# Patient Record
Sex: MALE | Race: WHITE | NOT HISPANIC OR LATINO | Employment: UNEMPLOYED | URBAN - METROPOLITAN AREA
[De-identification: names, ages, dates, MRNs, and addresses within clinical notes are randomized per-mention and may not be internally consistent; named-entity substitution may affect disease eponyms.]

---
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
## 2024-03-10 ENCOUNTER — OFFICE VISIT (OUTPATIENT)
Dept: URGENT CARE | Facility: CLINIC | Age: 24
End: 2024-03-10
Payer: COMMERCIAL

## 2024-03-10 ENCOUNTER — APPOINTMENT (OUTPATIENT)
Dept: URGENT CARE | Facility: CLINIC | Age: 24
End: 2024-03-10

## 2024-03-10 VITALS
BODY MASS INDEX: 23.78 KG/M2 | HEART RATE: 69 BPM | TEMPERATURE: 97.9 F | WEIGHT: 148 LBS | OXYGEN SATURATION: 97 % | DIASTOLIC BLOOD PRESSURE: 60 MMHG | HEIGHT: 66 IN | RESPIRATION RATE: 16 BRPM | SYSTOLIC BLOOD PRESSURE: 110 MMHG

## 2024-03-10 DIAGNOSIS — K12.1 STOMATITIS: Primary | ICD-10-CM

## 2024-03-10 DIAGNOSIS — J02.9 SORE THROAT: ICD-10-CM

## 2024-03-10 LAB — S PYO DNA SPEC NAA+PROBE: NOT DETECTED

## 2024-03-10 PROCEDURE — 3078F DIAST BP <80 MM HG: CPT | Performed by: PHYSICIAN ASSISTANT

## 2024-03-10 PROCEDURE — 87651 STREP A DNA AMP PROBE: CPT | Performed by: PHYSICIAN ASSISTANT

## 2024-03-10 PROCEDURE — 99203 OFFICE O/P NEW LOW 30 MIN: CPT | Performed by: PHYSICIAN ASSISTANT

## 2024-03-10 PROCEDURE — 3074F SYST BP LT 130 MM HG: CPT | Performed by: PHYSICIAN ASSISTANT

## 2024-03-10 ASSESSMENT — ENCOUNTER SYMPTOMS
SORE THROAT: 1
GASTROINTESTINAL NEGATIVE: 1
FEVER: 0
TROUBLE SWALLOWING: 0
CHILLS: 0
COUGH: 0
SWOLLEN GLANDS: 0
SHORTNESS OF BREATH: 0
WHEEZING: 0

## 2024-03-10 NOTE — LETTER
March 10, 2024         Patient: Evin Valdovinos   YOB: 2000   Date of Visit: 3/10/2024           To Whom it May Concern:    Evin Valdovinos was seen in my clinic on 3/10/2024. He may return to work on 3/12/2024 or sooner if condition improves sooner.       If you have any questions or concerns, please don't hesitate to call.        Sincerely,           Tasha Bravo P.A.-C.  Electronically Signed

## 2024-03-10 NOTE — PROGRESS NOTES
"Subjective     Evin Valdovinos is a 23 y.o. male who presents with Sore Throat (X 2 days )    PMH:  has no past medical history on file.  MEDS: No current outpatient medications on file.  ALLERGIES: No Known Allergies  SURGHX:   Past Surgical History:   Procedure Laterality Date    OTHER ORTHOPEDIC SURGERY      cleft lip     SOCHX:  reports that he has never smoked. He does not have any smokeless tobacco history on file. He reports that he does not drink alcohol and does not use drugs.  FH: Reviewed with patient, not pertinent to this visit.           Patient presents with:  Sore Throat: X 2 days with small spots on tonsils.  Patient is concerned he might have strep throat.  Patient denies fever, chills, headache, nausea vomiting or diarrhea.  Patient reports that everybody in his household has been sick over the course of the last month, he is the last person to get sick.   Patient has not tried any over-the-counter medications for his symptoms.  No other complaints.         Pharyngitis   This is a new problem. The current episode started yesterday. The problem has been gradually worsening. Neither side of throat is experiencing more pain than the other. There has been no fever. The pain is at a severity of 5/10. Pertinent negatives include no congestion, coughing, plugged ear sensation, shortness of breath, swollen glands or trouble swallowing. He has tried cool liquids and gargles for the symptoms. The treatment provided no relief.       Review of Systems   Constitutional:  Negative for chills and fever.   HENT:  Positive for sore throat. Negative for congestion and trouble swallowing.    Respiratory:  Negative for cough, shortness of breath and wheezing.    Gastrointestinal: Negative.    All other systems reviewed and are negative.             Objective     /60   Pulse 69   Temp 36.6 °C (97.9 °F)   Resp 16   Ht 1.676 m (5' 6\")   Wt 67.1 kg (148 lb)   SpO2 97%   BMI 23.89 kg/m²      Physical " Exam  Vitals and nursing note reviewed.   Constitutional:       General: He is not in acute distress.     Appearance: Normal appearance. He is well-developed. He is not ill-appearing or toxic-appearing.   HENT:      Head: Normocephalic and atraumatic.      Right Ear: Tympanic membrane normal.      Left Ear: Tympanic membrane normal.      Nose: Nose normal.      Mouth/Throat:      Lips: Pink.      Mouth: Mucous membranes are moist.      Pharynx: Oropharynx is clear. Uvula midline.        Comments: Cleft lip/palate.   Eyes:      Extraocular Movements: Extraocular movements intact.      Conjunctiva/sclera: Conjunctivae normal.      Pupils: Pupils are equal, round, and reactive to light.   Cardiovascular:      Rate and Rhythm: Normal rate and regular rhythm.      Pulses: Normal pulses.      Heart sounds: Normal heart sounds.   Pulmonary:      Effort: Pulmonary effort is normal.      Breath sounds: Normal breath sounds.   Abdominal:      General: Bowel sounds are normal.      Palpations: Abdomen is soft.   Musculoskeletal:         General: Normal range of motion.      Cervical back: Normal range of motion and neck supple.   Skin:     General: Skin is warm and dry.      Capillary Refill: Capillary refill takes less than 2 seconds.   Neurological:      General: No focal deficit present.      Mental Status: He is alert and oriented to person, place, and time.      Cranial Nerves: No cranial nerve deficit.      Motor: Motor function is intact.      Coordination: Coordination is intact.      Gait: Gait normal.   Psychiatric:         Mood and Affect: Mood normal.                             Assessment & Plan                   1. Stomatitis        2. Sore throat  POCT CEPHEID [employer] A STREP - PCR        Strep: neg    Patient HPI and physical exam consistent with stomatitis, likely viral in nature as patient has a 13-month-old niece who has also been ill.      PT instructed to take over the counter NSAIDS (ibuprofen, naproxen) as  needed for relief of pain, fever and swelling.  These can be taken for symptoms every (8,12) hours.      PT advised saltwater gargles/swishes  3-4 times daily until symptoms improve.     PT to follow band diet for the next few days, then progress to regular diet as tolerated.     Differential diagnosis, supportive care, and indications for immediate follow-up discussed with patient.  Instructed to return to clinic or nearest emergency department for any change in condition, further concerns, or worsening of symptoms.    I personally reviewed prior external notes and test results pertinent to today's visit.  I have independently reviewed and interpreted all diagnostics ordered during this urgent care visit.    PT should follow up with PCP in 1-2 days for re-evaluation if symptoms have not improved.      Discussed red flags and reasons to return to UC or ED.      Pt and/or family verbalized understanding of diagnosis and follow up instructions and was offered informational handout on diagnosis.  PT discharged.     Please note that this dictation was created using voice recognition software. I have made every reasonable attempt to correct obvious errors, but I expect that there may be errors of grammar and possibly content that I did not discover before finalizing the note.